# Patient Record
Sex: MALE | Race: BLACK OR AFRICAN AMERICAN | Employment: UNEMPLOYED | URBAN - METROPOLITAN AREA
[De-identification: names, ages, dates, MRNs, and addresses within clinical notes are randomized per-mention and may not be internally consistent; named-entity substitution may affect disease eponyms.]

---

## 2019-12-03 ENCOUNTER — OFFICE VISIT (OUTPATIENT)
Dept: INTERNAL MEDICINE CLINIC | Age: 34
End: 2019-12-03

## 2019-12-03 VITALS
HEART RATE: 105 BPM | SYSTOLIC BLOOD PRESSURE: 150 MMHG | RESPIRATION RATE: 20 BRPM | BODY MASS INDEX: 24.92 KG/M2 | HEIGHT: 72 IN | DIASTOLIC BLOOD PRESSURE: 86 MMHG | WEIGHT: 184 LBS | TEMPERATURE: 99 F | OXYGEN SATURATION: 97 %

## 2019-12-03 DIAGNOSIS — R19.5 CHANGE IN STOOL CALIBER: Primary | ICD-10-CM

## 2019-12-03 DIAGNOSIS — R03.0 ELEVATED BLOOD PRESSURE READING: ICD-10-CM

## 2019-12-03 DIAGNOSIS — F43.21 SITUATIONAL DEPRESSION: ICD-10-CM

## 2019-12-03 DIAGNOSIS — F12.90 MARIJUANA USE: ICD-10-CM

## 2019-12-03 DIAGNOSIS — Z13.29 SCREENING FOR THYROID DISORDER: ICD-10-CM

## 2019-12-03 DIAGNOSIS — Z13.220 SCREENING CHOLESTEROL LEVEL: ICD-10-CM

## 2019-12-03 NOTE — PROGRESS NOTES
Low grade temp today 99.0. Depression Screen-6. Patient states moving his bowels and a pencil shape stool came out about 2-3 weeks ago. Patient also has a candida breakout around his genitals. Previous PCP none.

## 2019-12-03 NOTE — PROGRESS NOTES
Chief Complaint   Patient presents with   [address]     he is a 28y.o. year old male who presents for evaluation. Today to get established. Patient reports he has not seen a physician in many years. He states his last eye exam was approximately 2 to 3 years ago. He has recently seen his dentist.  Patient reports his main concern today is about the change in his stool. He reports that he has been having stools that are shaped like pencils. He reports he is not sure if it is like this because this at the end of the stool or if there is something else going on patient does report that he has some intermittent left upper quadrant pain. He reports he did notice some color change in his stool while back but this is not been recently. Mr. Sherice Ortiz reports that he smokes marijuana daily. He states that he has been having some depression. This started after he lost his job here. Patient reports that he is trying to work through feelings that he has about this. Allergies no known allergies  History reviewed. No pertinent past medical history. History reviewed. No pertinent surgical history. History reviewed. No pertinent family history.   Social History     Socioeconomic History    Marital status: SINGLE     Spouse name: Not on file    Number of children: Not on file    Years of education: Not on file    Highest education level: Not on file   Tobacco Use    Smoking status: Current Every Day Smoker     Years: 18.00     Types: Pipe    Smokeless tobacco: Never Used    Tobacco comment: Balck and Milds cigars   Substance and Sexual Activity    Alcohol use: Yes     Frequency: Monthly or less     Drinks per session: 1 or 2     Binge frequency: Never    Drug use: Yes     Frequency: 10.0 times per week     Types: Marijuana     Comment: 1/4 oz    Sexual activity: Not Currently           Review of Systems - negative except as listed above    Objective:     Vitals:    12/03/19 1301 12/03/19 1317 12/03/19 1340 BP: (!) 163/102 (!) 159/104 150/86   Pulse: (!) 106 (!) 105    Resp: 20     Temp: 99 °F (37.2 °C)     TempSrc: Oral     SpO2: 97%     Weight: 184 lb (83.5 kg)     Height: 6' (1.829 m)       Physical Examination: General appearance - alert, well appearing, and in no distress  Mental status - normal mood, behavior, speech, dress, motor activity, and thought processes, patient is not suicidal or homicidal  Chest - clear to auscultation, no wheezes, rales or rhonchi, symmetric air entry  Heart - normal rate and regular rhythm, no murmurs noted  Abdomen - soft, nontender, nondistended, no masses or organomegaly    Assessment/ Plan:   Diagnoses and all orders for this visit:    1. Change in stool caliber  -     METABOLIC PANEL, COMPREHENSIVE; Future  -     REFERRAL TO GASTROENTEROLOGY    2. Situational depression  -     CBC WITH AUTOMATED DIFF; Future  -     METABOLIC PANEL, COMPREHENSIVE; Future  -     TSH 3RD GENERATION; Future    3. Marijuana use    4. Elevated blood pressure reading  -     CBC WITH AUTOMATED DIFF; Future  -     METABOLIC PANEL, COMPREHENSIVE; Future  -     TSH 3RD GENERATION; Future    5. Screening cholesterol level  -     LIPID PANEL; Future    6. Screening for thyroid disorder       Patient has been given a referral to see the gastroenterologist about the change of his stools. His blood pressure was elevated here in the office today. Patient reports that when he saw the dentist 2 weeks ago his blood pressure was okay. I have asked the patient to please  a blood pressure cuff for home and start to take his blood pressure daily. I would like for him to return here in 2 weeks with his blood pressure cuff and the readings from home. At that time we will compare his readings from home on his cuff with our cuff from here. The patient I talked about his marijuana use. I have explained to him that the marijuana may not be helping his mood. I have suggested that he consider medication. Patient does not want to start medication. He may be open to seeing a counselor therapist to help him with his feelings that he has about his previous job. The patient will get some labs done for me today. He will be contacted with these results once they are back. I have discussed the diagnosis with the patient and the intended plan as seen in the above orders. The patient has received an after-visit summary and questions were answered concerning future plans.      Medication Side Effects and Warnings were discussed with patient: n/a  Patient Labs were reviewed and or requested: n/a  Patient Past Records were reviewed and or requested  yes    Mary Bobo PA-C

## 2019-12-05 ENCOUNTER — HOSPITAL ENCOUNTER (OUTPATIENT)
Dept: LAB | Age: 34
Discharge: HOME | End: 2019-12-05

## 2019-12-05 DIAGNOSIS — R19.5 CHANGE IN STOOL CALIBER: ICD-10-CM

## 2019-12-05 DIAGNOSIS — F43.21 SITUATIONAL DEPRESSION: ICD-10-CM

## 2019-12-05 DIAGNOSIS — Z13.220 SCREENING CHOLESTEROL LEVEL: ICD-10-CM

## 2019-12-05 DIAGNOSIS — R03.0 ELEVATED BLOOD PRESSURE READING: ICD-10-CM

## 2019-12-05 LAB
ALBUMIN SERPL-MCNC: 4.2 G/DL (ref 3.5–5)
ALBUMIN/GLOB SERPL: 1.1 {RATIO} (ref 1.1–2.2)
ALP SERPL-CCNC: 92 U/L (ref 45–117)
ALT SERPL-CCNC: 89 U/L (ref 12–78)
ANION GAP SERPL CALC-SCNC: 5 MMOL/L (ref 5–15)
AST SERPL-CCNC: 32 U/L (ref 15–37)
BASOPHILS # BLD: 0 K/UL (ref 0–0.1)
BASOPHILS NFR BLD: 1 % (ref 0–1)
BILIRUB SERPL-MCNC: 0.3 MG/DL (ref 0.2–1)
BUN SERPL-MCNC: 9 MG/DL (ref 6–20)
BUN/CREAT SERPL: 9 (ref 12–20)
CALCIUM SERPL-MCNC: 9.2 MG/DL (ref 8.5–10.1)
CHLORIDE SERPL-SCNC: 108 MMOL/L (ref 97–108)
CHOLEST SERPL-MCNC: 189 MG/DL
CO2 SERPL-SCNC: 29 MMOL/L (ref 21–32)
CREAT SERPL-MCNC: 1.04 MG/DL (ref 0.7–1.3)
DIFFERENTIAL METHOD BLD: ABNORMAL
EOSINOPHIL # BLD: 0.1 K/UL (ref 0–0.4)
EOSINOPHIL NFR BLD: 2 % (ref 0–7)
ERYTHROCYTE [DISTWIDTH] IN BLOOD BY AUTOMATED COUNT: 15 % (ref 11.5–14.5)
GLOBULIN SER CALC-MCNC: 3.8 G/DL (ref 2–4)
GLUCOSE SERPL-MCNC: 111 MG/DL (ref 65–100)
HCT VFR BLD AUTO: 45.9 % (ref 36.6–50.3)
HDLC SERPL-MCNC: 47 MG/DL
HDLC SERPL: 4 {RATIO} (ref 0–5)
HGB BLD-MCNC: 14.4 G/DL (ref 12.1–17)
IMM GRANULOCYTES # BLD AUTO: 0 K/UL (ref 0–0.04)
IMM GRANULOCYTES NFR BLD AUTO: 0 % (ref 0–0.5)
LDLC SERPL CALC-MCNC: 129.4 MG/DL (ref 0–100)
LIPID PROFILE,FLP: ABNORMAL
LYMPHOCYTES # BLD: 2.4 K/UL (ref 0.8–3.5)
LYMPHOCYTES NFR BLD: 43 % (ref 12–49)
MCH RBC QN AUTO: 27.3 PG (ref 26–34)
MCHC RBC AUTO-ENTMCNC: 31.4 G/DL (ref 30–36.5)
MCV RBC AUTO: 87.1 FL (ref 80–99)
MONOCYTES # BLD: 0.5 K/UL (ref 0–1)
MONOCYTES NFR BLD: 9 % (ref 5–13)
NEUTS SEG # BLD: 2.5 K/UL (ref 1.8–8)
NEUTS SEG NFR BLD: 45 % (ref 32–75)
NRBC # BLD: 0 K/UL (ref 0–0.01)
NRBC BLD-RTO: 0 PER 100 WBC
PLATELET # BLD AUTO: 301 K/UL (ref 150–400)
PMV BLD AUTO: 9.9 FL (ref 8.9–12.9)
POTASSIUM SERPL-SCNC: 4 MMOL/L (ref 3.5–5.1)
PROT SERPL-MCNC: 8 G/DL (ref 6.4–8.2)
RBC # BLD AUTO: 5.27 M/UL (ref 4.1–5.7)
SODIUM SERPL-SCNC: 142 MMOL/L (ref 136–145)
TRIGL SERPL-MCNC: 63 MG/DL (ref ?–150)
TSH SERPL DL<=0.05 MIU/L-ACNC: 1.54 UIU/ML (ref 0.36–3.74)
VLDLC SERPL CALC-MCNC: 12.6 MG/DL
WBC # BLD AUTO: 5.6 K/UL (ref 4.1–11.1)

## 2019-12-06 LAB
EST. AVERAGE GLUCOSE BLD GHB EST-MCNC: 123 MG/DL
HBA1C MFR BLD: 5.9 % (ref 4–5.6)

## 2019-12-06 NOTE — PROGRESS NOTES
Please let the patient know his labs over all are good. I would like to add hemoglobin a1c due to elevated glucose. Please add this lab. ALT slightly elevated. We can recheck in 3 months.  thanks

## 2019-12-06 NOTE — PROGRESS NOTES
Writer contacted patient to inform of of lab results and further information per Gloria Messer, patient verbalized understanding.

## 2019-12-07 NOTE — PROGRESS NOTES
Please let the patient know that his hemoglobin A1c was 5.9. This is considered to be in the prediabetes range. I would like for the patient to really be mindful of his carbohydrates and sugary foods. Advised the patient to get regular exercise. I would like for us to recheck this number when we rechecked his liver function in 3 months.   Microphone off

## 2019-12-09 ENCOUNTER — TELEPHONE (OUTPATIENT)
Dept: INTERNAL MEDICINE CLINIC | Age: 34
End: 2019-12-09

## 2019-12-09 NOTE — PROGRESS NOTES
Writer spoke with patient to inform of lab results and instruction per Nohemi Casey, patient verbalized understanding.

## 2019-12-17 ENCOUNTER — OFFICE VISIT (OUTPATIENT)
Dept: INTERNAL MEDICINE CLINIC | Age: 34
End: 2019-12-17

## 2019-12-17 VITALS
TEMPERATURE: 98.4 F | HEART RATE: 83 BPM | RESPIRATION RATE: 20 BRPM | OXYGEN SATURATION: 97 % | WEIGHT: 189 LBS | SYSTOLIC BLOOD PRESSURE: 146 MMHG | BODY MASS INDEX: 25.6 KG/M2 | DIASTOLIC BLOOD PRESSURE: 86 MMHG | HEIGHT: 72 IN

## 2019-12-17 DIAGNOSIS — R03.0 ELEVATED BLOOD PRESSURE READING: Primary | ICD-10-CM

## 2019-12-17 DIAGNOSIS — F12.90 MARIJUANA USE: ICD-10-CM

## 2019-12-17 DIAGNOSIS — F43.21 SITUATIONAL DEPRESSION: ICD-10-CM

## 2019-12-17 NOTE — PROGRESS NOTES
Chief Complaint   Patient presents with    Hypertension    Nicotine Dependence     he is a 28y.o. year old male who presents for evaluation. Patient presents to the office to follow-up his previous visit. At visit the patient's blood pressure was elevated. I asked the patient to get a blood pressure cuff for home and start to check his blood pressure at home. The patient returned today with his blood pressure readings the highest being 140/90. He also is interested in discussing his labs as well as stopping to smoke cigars. Reviewed PmHx, RxHx, FmHx, SocHx, AllgHx and updated and dated in the chart. Review of Systems - negative except as listed above    Objective:     Vitals:    12/17/19 1417 12/17/19 1445   BP: (!) 162/97 146/86   Pulse: 83    Resp: 20    Temp: 98.4 °F (36.9 °C)    TempSrc: Oral    SpO2: 97%    Weight: 189 lb (85.7 kg)    Height: 6' (1.829 m)      Physical Examination: General appearance - alert, well appearing, and in no distress  Mental status - normal mood, behavior, speech, dress, motor activity, and thought processes, patient not suicidal or homicidal.    Assessment/ Plan:   Diagnoses and all orders for this visit:    1. Elevated blood pressure reading    2. Situational depression    3. Marijuana use    Patient's blood pressure was elevated slightly in the office today again. I suspect the patient is having white coat syndrome when here. I have asked him to continue to check his blood pressure at home and record the readings. We talked at length about his marijuana use and stopping of the cigars. Patient reports that he will try to work on this. He shares that he is considering moving to [place] with 1 of his friends. Today during the visit I went over all of his labs. His ALT was slightly elevated and we will recheck this in about 3 months. Patient still has his appointment with Dr. Lynett Meckel for February he believes the fifth.   I would like to see him back in the office here in 6 months. At that time he is to bring in his blood pressure readings. Patient has denied at this time to get a referral for a counselor. He reports that he would rather speak with the people that he has concerns with. The patient has assured me that this will not be a problem for him. Total encounter time was 25 minutes; over 50% of the time was spent counseling and coordinating care regarding blood pressure, marijuana use and depression. I have discussed the diagnosis with the patient and the intended plan as seen in the above orders. The patient has received an after-visit summary and questions were answered concerning future plans.      Medication Side Effects and Warnings were discussed with patient: n/a  Patient Labs were reviewed and or requested: yes  Patient Past Records were reviewed and or requested  yes    Mary Bobo PA-C

## 2020-03-30 ENCOUNTER — ANESTHESIA EVENT (OUTPATIENT)
Dept: ENDOSCOPY | Age: 35
End: 2020-03-30
Payer: COMMERCIAL

## 2020-03-30 ENCOUNTER — HOSPITAL ENCOUNTER (OUTPATIENT)
Age: 35
Setting detail: OUTPATIENT SURGERY
Discharge: HOME | End: 2020-03-30
Attending: INTERNAL MEDICINE | Admitting: INTERNAL MEDICINE
Payer: COMMERCIAL

## 2020-03-30 ENCOUNTER — ANESTHESIA (OUTPATIENT)
Dept: ENDOSCOPY | Age: 35
End: 2020-03-30
Payer: COMMERCIAL

## 2020-03-30 VITALS
HEART RATE: 92 BPM | HEIGHT: 72 IN | BODY MASS INDEX: 22.25 KG/M2 | SYSTOLIC BLOOD PRESSURE: 135 MMHG | DIASTOLIC BLOOD PRESSURE: 85 MMHG | TEMPERATURE: 98 F | OXYGEN SATURATION: 97 % | WEIGHT: 164.24 LBS | RESPIRATION RATE: 18 BRPM

## 2020-03-30 PROCEDURE — 76060000031 HC ANESTHESIA FIRST 0.5 HR: Performed by: INTERNAL MEDICINE

## 2020-03-30 PROCEDURE — 74011000250 HC RX REV CODE- 250: Performed by: NURSE ANESTHETIST, CERTIFIED REGISTERED

## 2020-03-30 PROCEDURE — 77030013992 HC SNR POLYP ENDOSC BSC -B: Performed by: INTERNAL MEDICINE

## 2020-03-30 PROCEDURE — 88305 TISSUE EXAM BY PATHOLOGIST: CPT

## 2020-03-30 PROCEDURE — 74011250636 HC RX REV CODE- 250/636: Performed by: NURSE ANESTHETIST, CERTIFIED REGISTERED

## 2020-03-30 PROCEDURE — 76040000019: Performed by: INTERNAL MEDICINE

## 2020-03-30 PROCEDURE — 74011250636 HC RX REV CODE- 250/636: Performed by: INTERNAL MEDICINE

## 2020-03-30 RX ORDER — PROPOFOL 10 MG/ML
INJECTION, EMULSION INTRAVENOUS
Status: DISCONTINUED | OUTPATIENT
Start: 2020-03-30 | End: 2020-03-30 | Stop reason: HOSPADM

## 2020-03-30 RX ORDER — SODIUM CHLORIDE 9 MG/ML
INJECTION, SOLUTION INTRAVENOUS
Status: DISCONTINUED | OUTPATIENT
Start: 2020-03-30 | End: 2020-03-30 | Stop reason: HOSPADM

## 2020-03-30 RX ORDER — CHOLECALCIFEROL (VITAMIN D3) 50 MCG
1 CAPSULE ORAL DAILY
COMMUNITY
End: 2020-06-16 | Stop reason: ALTCHOICE

## 2020-03-30 RX ORDER — EPINEPHRINE 0.1 MG/ML
1 INJECTION INTRACARDIAC; INTRAVENOUS
Status: DISCONTINUED | OUTPATIENT
Start: 2020-03-30 | End: 2020-03-30 | Stop reason: HOSPADM

## 2020-03-30 RX ORDER — SODIUM CHLORIDE 9 MG/ML
50 INJECTION, SOLUTION INTRAVENOUS CONTINUOUS
Status: DISCONTINUED | OUTPATIENT
Start: 2020-03-30 | End: 2020-03-30 | Stop reason: HOSPADM

## 2020-03-30 RX ORDER — NALOXONE HYDROCHLORIDE 0.4 MG/ML
0.4 INJECTION, SOLUTION INTRAMUSCULAR; INTRAVENOUS; SUBCUTANEOUS
Status: DISCONTINUED | OUTPATIENT
Start: 2020-03-30 | End: 2020-03-30 | Stop reason: HOSPADM

## 2020-03-30 RX ORDER — DEXTROMETHORPHAN/PSEUDOEPHED 2.5-7.5/.8
1.2 DROPS ORAL
Status: DISCONTINUED | OUTPATIENT
Start: 2020-03-30 | End: 2020-03-30 | Stop reason: HOSPADM

## 2020-03-30 RX ORDER — FLUMAZENIL 0.1 MG/ML
0.2 INJECTION INTRAVENOUS
Status: DISCONTINUED | OUTPATIENT
Start: 2020-03-30 | End: 2020-03-30 | Stop reason: HOSPADM

## 2020-03-30 RX ORDER — LIDOCAINE HYDROCHLORIDE 20 MG/ML
INJECTION, SOLUTION EPIDURAL; INFILTRATION; INTRACAUDAL; PERINEURAL AS NEEDED
Status: DISCONTINUED | OUTPATIENT
Start: 2020-03-30 | End: 2020-03-30 | Stop reason: HOSPADM

## 2020-03-30 RX ORDER — BISMUTH SUBSALICYLATE 262 MG
1 TABLET,CHEWABLE ORAL DAILY
COMMUNITY
End: 2021-10-21 | Stop reason: ALTCHOICE

## 2020-03-30 RX ORDER — PROPOFOL 10 MG/ML
INJECTION, EMULSION INTRAVENOUS AS NEEDED
Status: DISCONTINUED | OUTPATIENT
Start: 2020-03-30 | End: 2020-03-30 | Stop reason: HOSPADM

## 2020-03-30 RX ORDER — BACLOFEN 20 MG
1 TABLET ORAL 2 TIMES DAILY
COMMUNITY
End: 2021-10-21 | Stop reason: ALTCHOICE

## 2020-03-30 RX ORDER — MIDAZOLAM HYDROCHLORIDE 1 MG/ML
.25-5 INJECTION, SOLUTION INTRAMUSCULAR; INTRAVENOUS
Status: DISCONTINUED | OUTPATIENT
Start: 2020-03-30 | End: 2020-03-30 | Stop reason: HOSPADM

## 2020-03-30 RX ORDER — LANOLIN ALCOHOL/MO/W.PET/CERES
1000 CREAM (GRAM) TOPICAL DAILY
COMMUNITY
End: 2021-10-21 | Stop reason: ALTCHOICE

## 2020-03-30 RX ORDER — ATROPINE SULFATE 0.1 MG/ML
0.4 INJECTION INTRAVENOUS
Status: DISCONTINUED | OUTPATIENT
Start: 2020-03-30 | End: 2020-03-30 | Stop reason: HOSPADM

## 2020-03-30 RX ORDER — AMPICILLIN TRIHYDRATE 500 MG
1000 CAPSULE ORAL DAILY
COMMUNITY
End: 2021-10-21 | Stop reason: ALTCHOICE

## 2020-03-30 RX ADMIN — PROPOFOL 100 MG: 10 INJECTION, EMULSION INTRAVENOUS at 13:14

## 2020-03-30 RX ADMIN — PROPOFOL 140 MCG/KG/MIN: 10 INJECTION, EMULSION INTRAVENOUS at 13:13

## 2020-03-30 RX ADMIN — LIDOCAINE HYDROCHLORIDE 30 MG: 20 INJECTION, SOLUTION INTRAVENOUS at 13:14

## 2020-03-30 RX ADMIN — SODIUM CHLORIDE: 9 INJECTION, SOLUTION INTRAVENOUS at 13:10

## 2020-03-30 RX ADMIN — SODIUM CHLORIDE 50 ML/HR: 900 INJECTION, SOLUTION INTRAVENOUS at 12:25

## 2020-03-30 RX ADMIN — PROPOFOL 100 MG: 10 INJECTION, EMULSION INTRAVENOUS at 13:17

## 2020-03-30 NOTE — H&P
The patient is a 27year old male who presents with a complaint of Change in bowel habits. The patient presents consultation at the request of  (Ms Jeremy Calhoun). Note for \"Change in bowel habits\": Mid last year he started to notiec a change in the caliber of his stools, now they are pencil thin, but not consistent. He reports he has two to three bowel movements a day, depends on how much he eats. He feels some abdominal pain in the LUQ area, feels tight. He has seen blood in the stools, bright and dark. 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 He denies any family history of colon cancer or colon polyps. Problem List/Past Medical Karime Mcintosh; 2/5/2020 9:59 AM)  Candidiasis (112.9  B37.9)      Allergies Karime Mcintosh; 2/5/2020 9:59 AM)  No Known Drug Allergies  [01/28/2020]:  No Known Allergies  [01/28/2020]:    Medication History Karime Mcintosh; 2/5/2020 10:00 AM)  No Current Medications     Social History (Karime Mcintosh; 2/5/2020 9:59 AM)  Blood Transfusion   No.  Alcohol Use   Occasional alcohol use, Drinks beer. Employment status   N/a. Marital status   Single. Tobacco Use   Current every day smoker. Health Maintenance History Karime Mcintosh; 2/5/2020 9:59 AM)  Flu Vaccine   none  Pneumovax   none        Review of Systems (Karime Mcintosh; 2/5/2020 9:59 AM)  General Not Present- Chronic Fatigue, Poor Appetite, Weight Gain and Weight Loss. Skin Present- Itching. Not Present- Rash and Skin Color Changes. HEENT Present- Hearing Loss. Not Present- Vertigo. Respiratory Not Present- Difficulty Breathing and TB exposure. Cardiovascular Not Present- Chest Pain, Use of Antibiotics before Dental Procedures and Use of Blood Thinners. Gastrointestinal Present- See HPI.   Musculoskeletal Not Present- Arthritis, Hip Replacement Surgery and Knee Replacement Surgery. Neurological Present- Weakness. Psychiatric Present- Depression. Endocrine Not Present- Diabetes and Thyroid Problems. Hematology Not Present- Anemia. Vitals Krysta Arrow; 2/5/2020 9:59 AM)  2/5/2020 9:56 AM  Weight: 184.38 lb   Height: 72 in   Body Surface Area: 2.06 m²   Body Mass Index: 25.01 kg/m²    BP: 140/88 (Sitting, Left Arm, Standard)              Physical Exam (Jerry Noriega MD; 2/5/2020 10:07 AM)  General  Mental Status - Alert. General Appearance - Cooperative, Pleasant, Not in acute distress. Orientation - Oriented X3. Build & Nutrition - Well nourished and Well developed. Integumentary  General Characteristics  Overall examination of the patient's skin reveals - no rashes, no bruises and no spider angiomas. Color - normal coloration of skin. Head and Neck  Neck  Global Assessment - full range of motion and supple, no bruit auscultated on the right, no bruit auscultated on the left, non-tender, no lymphadenopathy. Thyroid  Gland Characteristics - normal size and consistency. Eye  Eyeball - Left - No Exophthalmos. Eyeball - Right - No Exophthalmos. Sclera/Conjunctiva - Left - No Jaundice. Sclera/Conjunctiva - Right - No Jaundice. Chest and Lung Exam  Chest and lung exam reveals  - quiet, even and easy respiratory effort with no use of accessory muscles. Auscultation  Breath sounds - Normal. Adventitious sounds - No Adventitious sounds. Cardiovascular  Auscultation  Rhythm - Regular, No Tachycardia, No Bradycardia . Heart Sounds - Normal heart sounds , S1 WNL and S2 WNL, No S3, No Summation Gallop. Murmurs & Other Heart Sounds - Auscultation of the heart reveals - No Murmurs. Abdomen  Inspection  Inspection of the abdomen reveals - Non-distended. Palpation/Percussion  Tenderness - Left Upper Quadrant (mild). Rebound tenderness - No rebound. Liver - No hepatosplenomegaly. Abdominal Mass Palpable - No masses.  Other Characteristics - No Ascites. Organomegally - None. Auscultation  Auscultation of the abdomen reveals - Bowel sounds normal, No Abdominal bruits and No Succussion splash. Rectal - Did not examine. Peripheral Vascular  Upper Extremity  Inspection - Left - Normal - No Clubbing, No Cyanosis, No Edema, Pulses Intact. Right - Normal - No Clubbing, No Cyanosis, No Edema, Pulses Intact. Palpation - Edema - Left - No edema. Right - No edema. Lower Extremity  Inspection - Left - Inspection Normal. Right - Inspection Normal. Palpation - Edema - Left - No edema. Right - No edema. Neurologic  Neurologic evaluation reveals  - Cranial nerves grossly intact, no focal neurologic deficits. Motor  Involuntary Movements - Asterixis - not present. Musculoskeletal  Global Assessment  Gait and Station - normal gait and station. Assessment & Plan (Jerry Goldman MD; 2/6/2020 8:01 PM)  Abdominal pain, acute, left upper quadrant (789.02  R10.12)  Impression: Suspect this to be related to constipation. However with pain, pencil thin stools and blood in the stools, will need to proceed with colonoscopy. Constipation (Preliminary Diagnosis) (564.00  K59.00)  Impression: Miralax and citrucel daily recommended along with increased daily fluid intake. Blood in stool (578.1  K92.1)  Current Plans  COLONOSCOPY, DIAGNOSTIC (43710) (Discussed risks and benefits with the patient to include:; perforation, post polypectomy, or post biopsy bleeding, missed lesions, and sedation reactions.)  Started Suprep Bowel Prep Kit 17.5-3.13-1.6GM/177ML, 180 Milliliter Take as directed before Colonoscopy, 180 Milliliter, 1 day starting 02/05/2020, No Refill. Pt Education - How to access health information online: discussed with patient and provided information. Patient is to call me for any questions or concerns.   Signed electronically by Christina Iyer MD (2/6/2020 8:01 PM)

## 2020-03-30 NOTE — ROUTINE PROCESS
Amity Situ  1985  [id number]    Situation:  Verbal report received from: Gifty Swenson  Procedure: Procedure(s):  COLONOSCOPY (ESSENTIAL)  ENDOSCOPIC POLYPECTOMY    Background:    Preoperative diagnosis: CHANGE IN BOWEL HABITS  Postoperative diagnosis: Diverticulosis, hemorrhoids, polyps. :  Dr. Inge Morris  Assistant(s): Endoscopy Technician-1: Rashmi Frausto  Endoscopy RN-1: Stephanie Souza RN    Specimens:   ID Type Source Tests Collected by Time Destination   1 : sigmoid colon polyps Preservative Sigmoid  Alta Srinivasan MD 3/30/2020 1326 Pathology     H. Pylori  no    Assessment:  Intra-procedure medications     Anesthesia gave intra-procedure sedation and medications, see anesthesia flow sheet yes    Intravenous fluids: NS@ KVO     Vital signs stable     Abdominal assessment: round and soft    Recommendation:  Discharge patient per MD order.     Family or Friend  Permission to share finding with family or friend yes

## 2020-03-30 NOTE — PERIOP NOTES
1313  Anesthesia staff at patient's bedside administering anesthesia and monitoring patients vital signs throughout procedure. See anesthesia note. 26  Endoscope was pre-cleaned at bedside immediately following procedure by demetrio Egan. 1333  Patient tolerated procedure without problems. Abdomen soft and patient arousable and voices no complaints Report received from CRNA, see anesthesia note. Patient transported to endoscopy recovery area. Report given to Aggie Urbina RN.

## 2020-03-30 NOTE — ANESTHESIA POSTPROCEDURE EVALUATION
Procedure(s):  COLONOSCOPY (ESSENTIAL)  ENDOSCOPIC POLYPECTOMY. MAC    Anesthesia Post Evaluation        Patient location during evaluation: PACU  Level of consciousness: awake  Pain management: adequate  Airway patency: patent  Anesthetic complications: no  Cardiovascular status: acceptable  Respiratory status: acceptable  Hydration status: acceptable  Post anesthesia nausea and vomiting:  none      Vitals Value Taken Time   /86 3/30/2020  1:51 PM   Temp 36.7 °C (98 °F) 3/30/2020  1:38 PM   Pulse 92 3/30/2020  1:56 PM   Resp 17 3/30/2020  1:55 PM   SpO2 97 % 3/30/2020  1:56 PM   Vitals shown include unvalidated device data.

## 2020-03-30 NOTE — PROCEDURES
Yadira Alvarez M.D.  [phone]            3/30/2020          Colonoscopy Operative Report  Jhon Nayak  :  1985  [facility] Medical Record Number:  [id number]      Indications:    Change in bowel habits, Lower rectal bleeding, LUQ pain    :  Adeel Pierre MD    Referring Provider: Loco Berkowitz PA-C    Sedation:  MAC anesthesia    Pre-Procedural Exam:      Airway: clear,  No airway problems anticipated  Heart: RRR, without gallops or rubs  Lungs: clear bilaterally without wheezes, crackles, or rhonchi  Abdomen: soft, nontender, nondistended, bowel sounds present  Mental Status: awake, alert and oriented to person, place and time     Procedure Details:  After informed consent was obtained with all risks and benefits of procedure explained and preoperative exam completed, the patient was taken to the endoscopy suite and placed in the left lateral decubitus position. Upon sequential sedation as per above, a digital rectal exam was performed. The Olympus videocolonoscope  was inserted in the rectum and carefully advanced to the cecum, which was identified by the ileocecal valve and appendiceal orifice. The quality of preparation was good. The colonoscope was slowly withdrawn with careful inspection and evaluation between folds. Retroflexion in the rectum was performed. Findings:   Terminal Ileum: normal  Cecum: normal  Ascending Colon: no mucosal lesion appreciated  mild diverticulosis;  Transverse Colon: normal  Descending Colon: normal  Sigmoid: 3  Sessile polyp(s), the largest 2 mm in size  mild diverticulosis; Rectum: no mucosal lesion appreciated  Grade 1 internal hemorrhoid(s); Interventions:  3 complete polypectomy were performed using cold biopsy forceps and the polyps were  retrieved    Specimen Removed:  specimen #1, 2 mm in size, located in the sigmoid removed by cold biopsy and sent for pathology    Complications: None.      EBL: None.    Impression: Three diminutive polyps seen in the sigmoid colon that were removed and sent to pathology                         Minimal diverticulosis and small internal hemorrhoids    Recommendations:  -If adenoma is present, repeat colonoscopy in 5 years.   -High fiber diet and maintain daily bowel regimen  -Resume normal medication(s). Discharge Disposition:  Home in the company of a  when able to ambulate.     Yesi Hinojosa MD  3/30/2020  1:32 PM

## 2020-03-30 NOTE — ANESTHESIA PREPROCEDURE EVALUATION
Relevant Problems   No relevant active problems       Anesthetic History   No history of anesthetic complications            Review of Systems / Medical History  Patient summary reviewed, nursing notes reviewed and pertinent labs reviewed    Pulmonary  Within defined limits                 Neuro/Psych   Within defined limits           Cardiovascular  Within defined limits                     GI/Hepatic/Renal     GERD           Endo/Other  Within defined limits           Other Findings              Physical Exam    Airway  Mallampati: I  TM Distance: 4 - 6 cm  Neck ROM: normal range of motion   Mouth opening: Normal     Cardiovascular    Rhythm: regular  Rate: normal         Dental    Dentition: Lower dentition intact and Upper dentition intact     Pulmonary  Breath sounds clear to auscultation               Abdominal         Other Findings            Anesthetic Plan    ASA: 2  Anesthesia type: MAC          Induction: Intravenous  Anesthetic plan and risks discussed with: Patient

## 2020-03-30 NOTE — DISCHARGE INSTRUCTIONS
[address] [facility]. Julien Viveros M.D.  [phone]            COLON DISCHARGE INSTRUCTIONS       3/30/2020    Marjorie Ramos  :  1985  [facility] Medical Record Number:  [id number]      COLONOSCOPY FINDINGS:  Your colonoscopy showed three diminutive and benign looking polyps, minimal diverticulosis and small internal hemorrhoids. DISCOMFORT:  Redness at IV site- apply warm compress to area; if redness or soreness persist- contact your physician  There may be a slight amount of blood passed from the rectum  Gaseous discomfort- walking, belching will help relieve any discomfort  You may not operate a vehicle for 12 hours  You may not engage in an occupation involving machinery or appliances for rest of today  You may not drink alcoholic beverages for at least 12 hours  Avoid making any critical decisions for at least 24 hour  DIET:   High fiber diet. - however -  remember your colon is empty and a heavy meal will produce gas. Avoid these foods:  vegetables, fried / greasy foods, carbonated drinks for today     ACTIVITY:  You may resume your normal daily activities it is recommended that you spend the remainder of the day resting -  avoid any strenuous activity. CALL M.D. ANY SIGN OF:   Increasing pain, nausea, vomiting  Abdominal distension (swelling)  New increased bleeding (oral or rectal)  Fever (chills)  Pain in chest area  Bloody discharge from nose or mouth   Shortness of breath    Follow-up Instructions:   Call Dr. Mela Michael if any questions or problems. Telephone # [phone]  Biopsy results will be available in  5 to 7 days  Should have a repeat colonoscopy in 5 years if polyps show adenoma, otherwise repeat at age 47. Continue with daily bowel regimen.

## 2020-06-16 ENCOUNTER — VIRTUAL VISIT (OUTPATIENT)
Dept: INTERNAL MEDICINE CLINIC | Age: 35
End: 2020-06-16

## 2020-06-16 DIAGNOSIS — R03.0 ELEVATED BLOOD PRESSURE READING: Primary | ICD-10-CM

## 2020-06-16 DIAGNOSIS — K59.09 INTERMITTENT CONSTIPATION: ICD-10-CM

## 2020-06-16 NOTE — PROGRESS NOTES
Manny Moore is a 27 y.o. male who was seen by synchronous (real-time) audio-video technology on 6/16/2020. Consent: Manny Moore, who was seen by synchronous (real-time) audio-video technology, and/or his healthcare decision maker, is aware that this patient-initiated, Telehealth encounter on 6/16/2020 is a billable service, with coverage as determined by his insurance carrier. He is aware that he may receive a bill and has provided verbal consent to proceed: Yes. Assessment & Plan:   Diagnoses and all orders for this visit:    1. Elevated blood pressure reading    2. Intermittent constipation    Patient I spoke about continuing to monitor blood pressure at home. We also spoke about changing his diet. I would like for the patient to cut back on fried foods and fast foods. Also would like for the patient to go back getting regular exercise such as walking. I would like for the patient to follow-up in about 3 months to see how he is doing with this change. I explained to the patient that I have not heard of the person having blood on the stool with the use of pre-biotics or probiotics. I explained that I am doubtful that it is coming from that especially since the patient is not experiencing any type of abdominal pain. I suspect this is coming from him having to strain due to some harder stools. I will look into this to see if I find anything on this and let the patient know. I spent at least 25 minutes on this visit with this established patient. [id number]  Subjective:   Manny Moore is a 27 y.o. male who was seen for Medication Evaluation  Patient presents to follow-up his blood pressure and he has a question about probiotics versus pre-biotics. He reports up until about 3 to 4 weeks ago he was trying to walk more. He reports that his diet has not changed substantially since last time. Patient admits that he is eating fast food and eating later at night.   The patient reports he has been checking his blood pressure and it has been ranging around 140/80 and a little lower. The patient also has a question about probiotics versus prebiotic's. He reports that he noticed whenever he uses these he has some blood on his stool. The patient reports that during this time he may be having some constipation and has to do some straining. Prior to Admission medications    Medication Sig Start Date End Date Taking? Authorizing Provider   TURMERIC PO Take 1 Cap by mouth daily. Provider, Historical   magnesium oxide 500 mg tab Take 1 Tab by mouth two (2) times a day. Indications: bowel regimen    Provider, Historical   omega 3-dha-epa-fish oil 100-160-1,000 mg cap Take 1 Cap by mouth daily. Provider, Historical   cyanocobalamin 1,000 mcg tablet Take 1,000 mcg by mouth daily. Provider, Historical   multivitamin (ONE A DAY) tablet Take 1 Tab by mouth daily. Provider, Historical   cholecalciferol (Vitamin D3) 25 mcg (1,000 unit) cap Take 1,000 Units by mouth daily. Provider, Historical   B.infantis-B.ani-B.long-B.bifi (Probiotic 4X) 10-15 mg TbEC Take 1 Cap by mouth daily. 6/16/20  Provider, Historical     No Known Allergies    There are no active problems to display for this patient. Current Outpatient Medications   Medication Sig Dispense Refill    TURMERIC PO Take 1 Cap by mouth daily.  magnesium oxide 500 mg tab Take 1 Tab by mouth two (2) times a day. Indications: bowel regimen      omega 3-dha-epa-fish oil 100-160-1,000 mg cap Take 1 Cap by mouth daily.  cyanocobalamin 1,000 mcg tablet Take 1,000 mcg by mouth daily.  multivitamin (ONE A DAY) tablet Take 1 Tab by mouth daily.  cholecalciferol (Vitamin D3) 25 mcg (1,000 unit) cap Take 1,000 Units by mouth daily. No Known Allergies    ROS      Objective: There were no vitals taken for this visit.    General: alert, cooperative, no distress   Mental  status: normal mood, behavior, speech, dress, motor activity, and thought processes, able to follow commands   HENT: NCAT   Neck: no visualized mass   Resp: no respiratory distress   Neuro: no gross deficits   Skin: no discoloration or lesions of concern on visible areas   Psychiatric: normal affect, consistent with stated mood, no evidence of hallucinations     Additional exam findings: We discussed the expected course, resolution and complications of the diagnosis(es) in detail. Medication risks, benefits, costs, interactions, and alternatives were discussed as indicated. I advised him to contact the office if his condition worsens, changes or fails to improve as anticipated. He expressed understanding with the diagnosis(es) and plan. Maye Vazquez is a 27 y.o. male who was evaluated by a video visit encounter for concerns as above. Patient identification was verified prior to start of the visit. A caregiver was present when appropriate. Due to this being a TeleHealth encounter (During [facility]-05 public health emergency), evaluation of the following organ systems was limited: Vitals/Constitutional/EENT/Resp/CV/GI//MS/Neuro/Skin/Heme-Lymph-Imm. Pursuant to the emergency declaration under the [facility], On license of [facility] waiver authority and the [employer] Act, this Virtual  Visit was conducted, with patient's (and/or legal guardian's) consent, to reduce the patient's risk of exposure to COVID-19 and provide necessary medical care. Services were provided through a video synchronous discussion virtually to substitute for in-person clinic visit. Patient and provider were located at their individual homes.       Gus Bobo PA-C

## 2021-10-07 ENCOUNTER — OFFICE VISIT (OUTPATIENT)
Dept: INTERNAL MEDICINE CLINIC | Age: 36
End: 2021-10-07
Payer: COMMERCIAL

## 2021-10-07 VITALS
OXYGEN SATURATION: 99 % | TEMPERATURE: 98.2 F | WEIGHT: 186 LBS | SYSTOLIC BLOOD PRESSURE: 148 MMHG | HEIGHT: 72 IN | BODY MASS INDEX: 25.19 KG/M2 | DIASTOLIC BLOOD PRESSURE: 84 MMHG | RESPIRATION RATE: 20 BRPM | HEART RATE: 82 BPM

## 2021-10-07 DIAGNOSIS — F32.A DEPRESSION, UNSPECIFIED DEPRESSION TYPE: ICD-10-CM

## 2021-10-07 DIAGNOSIS — R03.0 ELEVATED BLOOD PRESSURE READING: ICD-10-CM

## 2021-10-07 DIAGNOSIS — N50.9 TESTICULAR ABNORMALITY: Primary | ICD-10-CM

## 2021-10-07 PROCEDURE — 99214 OFFICE O/P EST MOD 30 MIN: CPT | Performed by: PHYSICIAN ASSISTANT

## 2021-10-07 NOTE — PROGRESS NOTES
Chief Complaint   Patient presents with    Depression    Melena    Skin Problem     he is a 38y.o. year old male who presents for evaluation. Patient presents to the office today for couple of concerns. He reports that he was watching YouTube and saw a commercial in reference to testicular cancer. The patient states that he then decided to check his testicles and believes he may have felt a lump on the right side. The patient states that he has not been having pain. He also reports that he has a small bump on the scrotum. The patient states he believes this is due to when he has problems with a yeast infection. He also reports that he has been having inconsistencies with his bowel movements. The patient states that he believes this is related to his diet. The patient states that his diet is not the best.  He admits to eating fast food frequently. The patient states that he contributes his diet habits to when he is feeling depressed. He also reports that he has noticed some blood on the tissue paper when wiping. He reports that this is after he has had a hard stool. Reviewed PmHx, RxHx, FmHx, SocHx, AllgHx and updated and dated in the chart. Review of Systems - negative except as listed above    Objective:     Vitals:    10/07/21 0719 10/07/21 0807   BP: (!) 166/95 (!) 148/84   Pulse: 82    Resp: 20    Temp: 98.2 °F (36.8 °C)    TempSrc: Temporal    SpO2: 99%    Weight: 186 lb (84.4 kg)    Height: 6' (1.829 m)      Physical Examination: General appearance - alert, well appearing, and in no distress  Mental status - normal mood, behavior, speech, dress, motor activity, and thought processes   Male - TESTICULAR EXAM: Small slightly raised papule noted on the right scrotum. I am not able to appreciate a distinct mass of the testicle. No tenderness is noted with physical exam.    Assessment/ Plan:   Diagnoses and all orders for this visit:    1.  Testicular abnormality  -     US SCROTUM/TESTICLES; Future    2. Elevated blood pressure reading    I explained to the patient that the area of concern I believe is the spermatic cord. I will have the patient ultrasound to see if any abnormalities is found. He and I spoke at length today about his diet. The patient's blood pressure was elevated here in the office. He does have a blood pressure cuff at home and advised him to start taking his blood pressure at least once a day. The patient will be returning back here in 2 weeks with his blood pressure reading as well as his cuff. He agrees that change in his diet would help with his bowels as well as his blood pressure. We also talked about his mental health. The patient reports that he knows what he needs to do to move forward. He is not interested in starting on medication at this time. I have discussed the diagnosis with the patient and the intended plan as seen in the above orders. The patient has received an after-visit summary and questions were answered concerning future plans.      Medication Side Effects and Warnings were discussed with patient: n/a  Patient Labs were reviewed and or requested: n/a  Patient Past Records were reviewed and or requested  yes    Mary Bobo PA-C

## 2021-10-07 NOTE — PROGRESS NOTES
1. Have you been to the ER, urgent care clinic since your last visit? Hospitalized since your last visit? No    2. Have you seen or consulted any other health care providers outside of the [address] [place] [address] since your last visit? Include any pap smears or colon screening. Yes, colonoscopy,     Health Maintenance Due   Topic Date Due    Hepatitis C Screening  Never done    Pneumococcal 0-64 years (1 of 2 - PPSV23) Never done    COVID-19 Vaccine (1) Never done    DTaP/Tdap/Td series (1 - Tdap) Never done    Flu Vaccine (1) Never done     Depression Screen 5. Refuses flu shot. Patient c/o heart health, numbness tingling foot, arm, hand, muscle spasms in chest, foot, jaw, about 2 months. Patient c/o possible prolapsed rectum, blood in stool x4 years, patient states seeing skin outside of rectum, x6 months. Patient c/o lumps on and in his scrotum. Patient depression screen 5.

## 2021-10-15 ENCOUNTER — HOSPITAL ENCOUNTER (OUTPATIENT)
Dept: ULTRASOUND IMAGING | Age: 36
Discharge: HOME | End: 2021-10-15
Payer: COMMERCIAL

## 2021-10-15 ENCOUNTER — TELEPHONE (OUTPATIENT)
Dept: INTERNAL MEDICINE CLINIC | Age: 36
End: 2021-10-15

## 2021-10-15 DIAGNOSIS — N50.9 TESTICULAR ABNORMALITY: ICD-10-CM

## 2021-10-15 PROCEDURE — 76870 US EXAM SCROTUM: CPT | Performed by: PHYSICIAN ASSISTANT

## 2021-10-15 NOTE — PROGRESS NOTES
Please let the patient know he has a small hydrocele of the right testicle and a trace of the left. Also the patient has bilateral cyst of the epididymal head. Advise him I would like for him to contact the urologist for an appointment to follow up these findings. [facility] .  Thank you

## 2021-10-21 ENCOUNTER — OFFICE VISIT (OUTPATIENT)
Dept: INTERNAL MEDICINE CLINIC | Age: 36
End: 2021-10-21
Payer: COMMERCIAL

## 2021-10-21 VITALS
TEMPERATURE: 98 F | RESPIRATION RATE: 20 BRPM | SYSTOLIC BLOOD PRESSURE: 138 MMHG | HEART RATE: 89 BPM | DIASTOLIC BLOOD PRESSURE: 78 MMHG | HEIGHT: 72 IN | OXYGEN SATURATION: 98 % | BODY MASS INDEX: 26.55 KG/M2 | WEIGHT: 196 LBS

## 2021-10-21 DIAGNOSIS — R03.0 ELEVATED BLOOD PRESSURE READING: Primary | ICD-10-CM

## 2021-10-21 PROCEDURE — 99213 OFFICE O/P EST LOW 20 MIN: CPT | Performed by: PHYSICIAN ASSISTANT

## 2021-10-21 NOTE — PROGRESS NOTES
Chief Complaint   Patient presents with    Hypertension     he is a 38y.o. year old male who presents for evaluation. The patient presents today to follow-up his blood pressure. He reports that he has been checking at home. The patient reports that his blood pressure has been running 120/80 with the highest being 130/80. The patient states that he is active with exercise. He also shares that he has an appointment to see the urologist next Monday. Reviewed PmHx, RxHx, FmHx, SocHx, AllgHx and updated and dated in the chart. Review of Systems - negative except as listed above    Objective:     Vitals:    10/21/21 0806 10/21/21 0830   BP: (!) 153/91 138/78   Pulse: 89    Resp: 20    Temp: 98 °F (36.7 °C)    TempSrc: Oral    SpO2: 98%    Weight: 196 lb (88.9 kg)    Height: 6' (1.829 m)      Physical Examination: General appearance - alert, well appearing, and in no distress  Mental status - normal mood, behavior, speech, dress, motor activity, and thought processes  Chest - clear to auscultation, no wheezes, rales or rhonchi, symmetric air entry  Heart - normal rate and regular rhythm, no murmurs noted    Assessment/ Plan:   Diagnoses and all orders for this visit:    1. Elevated blood pressure reading       I like for the patient to continue to monitor his blood pressure at home. He will continue to exercise on a regular schedule as well. The patient understands if his blood pressure starts to run high he is to contact the office. I like for the patient to follow-up here in 6 months. Patient will keep his appointment with the urologist for next Monday. I have discussed the diagnosis with the patient and the intended plan as seen in the above orders. The patient has received an after-visit summary and questions were answered concerning future plans.      Medication Side Effects and Warnings were discussed with patient: n/a  Patient Labs were reviewed and or requested: yes  Patient Past Records were reviewed and or requested  yes    Mary Bobo PA-C

## 2023-03-08 ENCOUNTER — OFFICE VISIT (OUTPATIENT)
Dept: URGENT CARE | Age: 38
End: 2023-03-08
Payer: COMMERCIAL

## 2023-03-08 VITALS
RESPIRATION RATE: 16 BRPM | BODY MASS INDEX: 25.36 KG/M2 | TEMPERATURE: 97.8 F | DIASTOLIC BLOOD PRESSURE: 82 MMHG | SYSTOLIC BLOOD PRESSURE: 112 MMHG | HEART RATE: 99 BPM | WEIGHT: 187 LBS | OXYGEN SATURATION: 99 %

## 2023-03-08 DIAGNOSIS — R42 FEELING FAINT: ICD-10-CM

## 2023-03-08 DIAGNOSIS — F41.9 ANXIOUS MOOD: Primary | ICD-10-CM

## 2023-03-08 DIAGNOSIS — G47.00 INSOMNIA, UNSPECIFIED TYPE: ICD-10-CM

## 2023-03-08 PROCEDURE — S9083 URGENT CARE CENTER GLOBAL: HCPCS | Performed by: NURSE PRACTITIONER

## 2023-03-08 PROCEDURE — 93000 ELECTROCARDIOGRAM COMPLETE: CPT | Performed by: NURSE PRACTITIONER

## 2023-03-08 NOTE — PROGRESS NOTES
HPI     Pt presents with concerns about his BP possibly being low for 1-2 weeks. Feels like it is low to him. Occasionally feels faint but no syncopal episodes. Feel stressed and has been smoking more marijuana and pipe tobacco cigarettes (Black and Milds) than usual.  Hasn't slept well in \"years\". Currently not working. States he sits in dark all day watching tv and on his phone. Denies feeling down or depressed. States he's in a state of confusion and rebuilding. Lives alone. Not currently working. Previous job caused stress and PTSD. States he doesn't have friends. Has brothers that he hangs out with from time to time but feels anxious and not sure if it's a positive relationship. Denies support from other family. Has difficulty in social situations. Has been to counseling in past, doesn't feel like it helped but was more agitated then. Past Medical History:   Diagnosis Date    GERD (gastroesophageal reflux disease)         Past Surgical History:   Procedure Laterality Date    COLONOSCOPY N/A 3/30/2020    [facility]         History reviewed. No pertinent family history.      Social History     Socioeconomic History    Marital status: SINGLE     Spouse name: Not on file    Number of children: Not on file    Years of education: Not on file    Highest education level: Not on file   Occupational History    Not on file   Tobacco Use    Smoking status: Every Day     Types: Pipe    Smokeless tobacco: Never    Tobacco comments:     Balck and Milds cigars   Substance and Sexual Activity    Alcohol use: Not Currently    Drug use: Yes     Frequency: 10.0 times per week     Types: Marijuana     Comment: 1/4 oz    Sexual activity: Not Currently   Other Topics Concern    Not on file   Social History Narrative    Not on file     Social Determinants of Health     Financial Resource Strain: Not on file   Food Insecurity: Not on file   Transportation Needs: Not on file   Physical Activity: Not on file   Stress: Not on file   Social Connections: Not on file   Intimate Partner Violence: Not on file   Housing Stability: Not on file                ALLERGIES: Patient has no known allergies. Review of Systems   Constitutional:  Negative for chills and fever. Respiratory:  Negative for shortness of breath. Cardiovascular:  Positive for palpitations. Negative for chest pain and leg swelling. Neurological:  Positive for light-headedness. Negative for dizziness and weakness. Psychiatric/Behavioral:  Positive for confusion, dysphoric mood and sleep disturbance. Negative for agitation and suicidal ideas. The patient is nervous/anxious. Vitals:    03/08/23 1059 03/08/23 1100 03/08/23 1123   BP: (!) 176/99 (!) 166/83 112/82   Pulse: 99     Resp: 16     Temp: 97.8 °F (36.6 °C)     SpO2: 99%     Weight: 187 lb (84.8 kg)         Physical Exam  Constitutional:       General: He is not in acute distress. Appearance: Normal appearance. He is not ill-appearing or toxic-appearing. HENT:      Head: Normocephalic and atraumatic. Mouth/Throat:      Mouth: Mucous membranes are moist.      Pharynx: Oropharynx is clear. Eyes:      Conjunctiva/sclera:      Right eye: Right conjunctiva is injected. Left eye: Left conjunctiva is injected. Cardiovascular:      Rate and Rhythm: Normal rate and regular rhythm. Heart sounds: Normal heart sounds, S1 normal and S2 normal.   Pulmonary:      Effort: Pulmonary effort is normal.      Breath sounds: Normal breath sounds. Musculoskeletal:      Right lower leg: No edema. Left lower leg: No edema. Skin:     General: Skin is warm and dry. Neurological:      General: No focal deficit present. Mental Status: He is alert and oriented to person, place, and time.       Gait: Gait normal.   Psychiatric:         Attention and Perception: Attention and perception normal.         Mood and Affect: Mood is anxious. Speech: Speech normal.         Behavior: Behavior normal. Behavior is cooperative. Thought Content: Thought content normal.         Cognition and Memory: Cognition and memory normal.         Judgment: Judgment normal.      Comments: Pleasant, good insight. Maintains eye contact, smiling. Repeat BP by me 112/82 - R arm, manual.      ICD-10-CM ICD-9-CM   1. Insomnia, unspecified type  G47.00 780.52   2. Feeling faint  R42 780.4       Orders Placed This Encounter    EKG, 12 LEAD, INITIAL     Order Specific Question:   Reason for Exam:     Answer:   hypertention      Encouraged to try to cut down on smokes and marijuana. Encouraged to continue working on himself, consider employment and counseling. The patient is to follow up with PCP for full CPE. If signs and symptoms become worse the pt is to go to the ER. Codey Morris NP       [facility]    EKG      Date/Time: 3/8/2023 12:03 PM  Performed by: Jayce Price NP  Authorized by:  Jayce Price NP   Previous ECG: no previous ECG available  Rhythm: sinus rhythm  BPM: 84  QRS axis: normal  Clinical impression: non-specific ECG  Comments: NSR  Possible Left atrial enlargement  [place] ECG

## 2023-06-22 ENCOUNTER — OFFICE VISIT (OUTPATIENT)
Age: 38
End: 2023-06-22
Payer: COMMERCIAL

## 2023-06-22 VITALS
BODY MASS INDEX: 25.36 KG/M2 | TEMPERATURE: 98.1 F | OXYGEN SATURATION: 98 % | SYSTOLIC BLOOD PRESSURE: 160 MMHG | RESPIRATION RATE: 14 BRPM | DIASTOLIC BLOOD PRESSURE: 100 MMHG | HEIGHT: 72 IN | HEART RATE: 92 BPM

## 2023-06-22 DIAGNOSIS — Z11.59 ENCOUNTER FOR HEPATITIS C SCREENING TEST FOR LOW RISK PATIENT: ICD-10-CM

## 2023-06-22 DIAGNOSIS — F33.9 RECURRENT MAJOR DEPRESSIVE DISORDER, REMISSION STATUS UNSPECIFIED (HCC): ICD-10-CM

## 2023-06-22 DIAGNOSIS — R03.0 ELEVATED BLOOD-PRESSURE READING, WITHOUT DIAGNOSIS OF HYPERTENSION: Primary | ICD-10-CM

## 2023-06-22 DIAGNOSIS — Z13.220 SCREENING FOR LIPID DISORDERS: ICD-10-CM

## 2023-06-22 DIAGNOSIS — F17.200 SMOKER: ICD-10-CM

## 2023-06-22 DIAGNOSIS — R03.0 ELEVATED BLOOD-PRESSURE READING, WITHOUT DIAGNOSIS OF HYPERTENSION: ICD-10-CM

## 2023-06-22 PROCEDURE — 99214 OFFICE O/P EST MOD 30 MIN: CPT | Performed by: NURSE PRACTITIONER

## 2023-06-22 SDOH — ECONOMIC STABILITY: INCOME INSECURITY: HOW HARD IS IT FOR YOU TO PAY FOR THE VERY BASICS LIKE FOOD, HOUSING, MEDICAL CARE, AND HEATING?: PATIENT DECLINED

## 2023-06-22 SDOH — ECONOMIC STABILITY: FOOD INSECURITY: WITHIN THE PAST 12 MONTHS, YOU WORRIED THAT YOUR FOOD WOULD RUN OUT BEFORE YOU GOT MONEY TO BUY MORE.: PATIENT DECLINED

## 2023-06-22 SDOH — ECONOMIC STABILITY: HOUSING INSECURITY
IN THE LAST 12 MONTHS, WAS THERE A TIME WHEN YOU DID NOT HAVE A STEADY PLACE TO SLEEP OR SLEPT IN A SHELTER (INCLUDING NOW)?: NO

## 2023-06-22 SDOH — ECONOMIC STABILITY: FOOD INSECURITY: WITHIN THE PAST 12 MONTHS, THE FOOD YOU BOUGHT JUST DIDN'T LAST AND YOU DIDN'T HAVE MONEY TO GET MORE.: PATIENT DECLINED

## 2023-06-22 ASSESSMENT — PATIENT HEALTH QUESTIONNAIRE - PHQ9
7. TROUBLE CONCENTRATING ON THINGS, SUCH AS READING THE NEWSPAPER OR WATCHING TELEVISION: 0
3. TROUBLE FALLING OR STAYING ASLEEP: 0
5. POOR APPETITE OR OVEREATING: 0
6. FEELING BAD ABOUT YOURSELF - OR THAT YOU ARE A FAILURE OR HAVE LET YOURSELF OR YOUR FAMILY DOWN: 1
SUM OF ALL RESPONSES TO PHQ QUESTIONS 1-9: 1
SUM OF ALL RESPONSES TO PHQ QUESTIONS 1-9: 1
2. FEELING DOWN, DEPRESSED OR HOPELESS: 0
1. LITTLE INTEREST OR PLEASURE IN DOING THINGS: 0
SUM OF ALL RESPONSES TO PHQ QUESTIONS 1-9: 1
SUM OF ALL RESPONSES TO PHQ QUESTIONS 1-9: 1
9. THOUGHTS THAT YOU WOULD BE BETTER OFF DEAD, OR OF HURTING YOURSELF: 0
SUM OF ALL RESPONSES TO PHQ9 QUESTIONS 1 & 2: 0
4. FEELING TIRED OR HAVING LITTLE ENERGY: 0
8. MOVING OR SPEAKING SO SLOWLY THAT OTHER PEOPLE COULD HAVE NOTICED. OR THE OPPOSITE, BEING SO FIGETY OR RESTLESS THAT YOU HAVE BEEN MOVING AROUND A LOT MORE THAN USUAL: 0

## 2023-06-22 ASSESSMENT — ENCOUNTER SYMPTOMS: SHORTNESS OF BREATH: 0

## 2023-06-22 NOTE — PATIENT INSTRUCTIONS
FOLLOW THESE INSTRUCTIONS AT HOME:    Check your blood pressure as often as recommended by your health care provider. Check your blood pressure at the same time every day. Take your monitor to the next appointment with your health care provider to make sure that:  You are using it correctly. It provides accurate readings. Be sure you understand what your goal blood pressure numbers are. Tell your health care provider if you are having any side effects from blood pressure medicine. Keep all follow-up visits as told by your health care provider. This is important. Patient Counseling  Cardiovascular Health discussed. Current risk factors incljude:  Discussed importance of regular dental and eye visits  Discussed the importance of maintaining a healthy BMI. Discussed healthy food choices. Stressed importance of regular exercise  Discussed moderation of alcohol intake and tobacco cessation or other substances.

## 2023-06-22 NOTE — PROGRESS NOTES
Norma Diaz is a 44 y.o. male  Chief Complaint   Patient presents with    New Patient     No concerns - would like blood work done fasting        Vitals:    06/22/23 1454   BP: (!) 143/92   Pulse: 92   Resp: 14   Temp: 98.1 °F (36.7 °C)   SpO2: 98%      Wt Readings from Last 3 Encounters:   03/08/23 187 lb (84.8 kg)   10/21/21 196 lb (88.9 kg)   10/07/21 186 lb (84.4 kg)     BMI Readings from Last 3 Encounters:   06/22/23 25.36 kg/m²   03/08/23 25.36 kg/m²   10/21/21 26.58 kg/m²     Health Maintenance Due   Topic Date Due    COVID-19 Vaccine (1) Never done    Varicella vaccine (1 of 2 - 2-dose childhood series) Never done    Pneumococcal 0-64 years Vaccine (1 - PCV) Never done    HIV screen  Never done    Hepatitis C screen  Never done    DTaP/Tdap/Td vaccine (1 - Tdap) Never done    A1C test (Diabetic or Prediabetic)  12/05/2020    Depression Monitoring  10/07/2022       HPI  Patient here for follow up  Seen in urgent care several months ago  Was feeling  stressed and has been smoking more marijuana and pipe tobacco cigarettes (Black and Milds) than usual.   Hasn't slept well in \"years\". Currently not working. States he sits in dark all day watching tv and on his phone. Denies feeling down or depressed. Depression/ Insomnia    Elevated blood pressure - poor diet. Smokes cigars. Not currently smoking marijuana. No weight  gain. 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

## 2023-06-23 LAB
ALBUMIN SERPL-MCNC: 4.1 G/DL (ref 3.5–5)
ALBUMIN/GLOB SERPL: 1.1 (ref 1.1–2.2)
ALP SERPL-CCNC: 96 U/L (ref 45–117)
ALT SERPL-CCNC: 80 U/L (ref 12–78)
ANION GAP SERPL CALC-SCNC: 3 MMOL/L (ref 5–15)
AST SERPL-CCNC: 33 U/L (ref 15–37)
BILIRUB SERPL-MCNC: 0.4 MG/DL (ref 0.2–1)
BUN SERPL-MCNC: 10 MG/DL (ref 6–20)
BUN/CREAT SERPL: 9 (ref 12–20)
CALCIUM SERPL-MCNC: 9.5 MG/DL (ref 8.5–10.1)
CHLORIDE SERPL-SCNC: 107 MMOL/L (ref 97–108)
CHOLEST SERPL-MCNC: 235 MG/DL
CO2 SERPL-SCNC: 29 MMOL/L (ref 21–32)
CREAT SERPL-MCNC: 1.06 MG/DL (ref 0.7–1.3)
EST. AVERAGE GLUCOSE BLD GHB EST-MCNC: 105 MG/DL
GLOBULIN SER CALC-MCNC: 3.9 G/DL (ref 2–4)
GLUCOSE SERPL-MCNC: 115 MG/DL (ref 65–100)
HBA1C MFR BLD: 5.3 % (ref 4–5.6)
HCV AB SERPL QL IA: NONREACTIVE
HDLC SERPL-MCNC: 57 MG/DL
HDLC SERPL: 4.1 (ref 0–5)
LDLC SERPL CALC-MCNC: 156.8 MG/DL (ref 0–100)
POTASSIUM SERPL-SCNC: 4.3 MMOL/L (ref 3.5–5.1)
PROT SERPL-MCNC: 8 G/DL (ref 6.4–8.2)
SODIUM SERPL-SCNC: 139 MMOL/L (ref 136–145)
TRIGL SERPL-MCNC: 106 MG/DL
VLDLC SERPL CALC-MCNC: 21.2 MG/DL

## 2023-08-09 NOTE — PROGRESS NOTES
8/9/2023         RE: Mumtaz Montoya  [address]  [facility] [address]        Dear Colleague,    Thank you for referring your patient, Mumtaz Montoya, to the [facility]. Please see a copy of my visit note below.    Diabetes Self-Management Education & Support    Presents for: Individual review    Type of Service: Telephone Visit    Originating Location (Patient Location): Home  Distant Location (Provider Location): Offsite  Mode of Communication:  Telephone    Telephone Visit Start Time:  11:00 AM  Telephone Visit End Time (telephone visit stop time): 11:31 AM    How would patient like to obtain AVS? Decliend    Assessment Type:   REPORTS:          Pt verbalized understanding of concepts discussed and recommendations provided today.       Continue education with the following diabetes management concepts: Monitoring, Taking Medication, Problem Solving, and Reducing Risks    ASSESSMENT:  Spoke with patient with  ID# [id number] and son Julio.     Patient started taking Victoza at 0.6 mg/day and noticed more low BG as evidenced above in the Herson report. When this happened she felt shaky and stopped the blue pen (Victoza). Patient reports feeling better now off Victoza - during this time she was still using Lantus at 30 units.   We discussed restarting Victoza and reducing Lantus to 25 units/day. Patient and son agreeable and demonstrated understanding.  Reviewed importance of getting a Herson back on to wear. Pt states that she checks BG with a meter but unable to report the numbers - just states that it is up and down.   Reviewed hypoglycemia signs and symptoms.  Also reviewed importance of avoiding juice, which patient states she has not been drinking   Glucose Patterns & Trends:  Hypoglycemia, weekend- nocturnal and weekday- nocturnal - these Herson Reports are from 10 days ago - patient has not replaced sensor.     PLAN  Continue with glipizide 5 mg twice daily  Restart Victoza 0.6  1. Have you been to the ER, urgent care clinic since your last visit? Hospitalized since your last visit? no    2. Have you seen or consulted any other health care providers outside of the [address] [place] [address] since your last visit? Include any pap smears or colon screening. No      Health Maintenance Due   Topic Date Due    Hepatitis C Screening  Never done    Pneumococcal 0-64 years (1 of 2 - PPSV23) Never done    COVID-19 Vaccine (1) Never done    DTaP/Tdap/Td series (1 - Tdap) Never done    Flu Vaccine (1) Never done     Patient refuses flu shot. Patient here today to follow up with BP.  Patient cuff 150/80 "mg/day & Decrease Lantus to 25 units/day  Monitor BG daily with glucometer or restart Herson - sent to pharmacy, son aware.   Topics to cover at upcoming visits: Monitoring, Taking Medication, Problem Solving, and Reducing Risks    Follow-up: 4 weeks in person    See Care Plan for co-developed, patient-state behavior change goals.  AVS provided for patient today.    Education Materials Provided:  No new materials provided today      SUBJECTIVE/OBJECTIVE:  Presents for: Individual review  Accompanied by: Self, , Son  Diabetes education in the past 24mo: Yes  Focus of Visit: Patient Unsure  Diabetes type: Type 2  Disease course: Improving  Diabetes management related comments/concerns: low BG/what meds to take  Transportation concerns: Yes  Difficulty affording diabetes medication?: Sometimes  Other concerns:: Language barrier  Cultural Influences/Ethnic Background:  Not  or       Diabetes Symptoms & Complications:  Fatigue: No  Neuropathy: No  Polydipsia: No  Polyphagia: No  Polyuria: No  Visual change: Yes  Symptom course: Improving  Complications assessed today?: No    Patient Problem List and Family Medical History reviewed for relevant medical history, current medical status, and diabetes risk factors.    Vitals:  LMP  (LMP Unknown)   Estimated body mass index is 26.87 kg/m  as calculated from the following:    Height as of 7/17/23: 1.575 m (5' 2\").    Weight as of 7/17/23: 66.6 kg (146 lb 14.4 oz).   Last 3 BP:   BP Readings from Last 3 Encounters:   07/17/23 127/87   05/24/23 118/84   05/16/23 118/80       History   Smoking Status     Never   Smokeless Tobacco     Never       Labs:  Lab Results   Component Value Date    A1C 9.6 07/17/2023     Lab Results   Component Value Date     05/16/2023     Lab Results   Component Value Date     05/16/2023     Direct Measure HDL   Date Value Ref Range Status   05/16/2023 32 (L) >=50 mg/dL Final   ]  GFR Estimate   Date Value Ref Range " Status   05/16/2023 >90 >60 mL/min/1.73m2 Final     Comment:     eGFR calculated using 2021 CKD-EPI equation.     No results found for: GFRESTBLACK  Lab Results   Component Value Date    CR 0.40 05/16/2023     No results found for: MICROALBUMIN    Healthy Eating:  Healthy Eating Assessed Today: Yes  Meal planning/habits: Avoiding sweets  Meals include: Breakfast, Lunch, Dinner  Breakfast: rice and broderick chicken/pork, green vegetables/cucumber  Lunch: rice and broderick chicken/pork, green vegetables/cucumber  Dinner: rice and broderick chicken/pork, green vegetables/cucumber  Snacks: jermaine,  Other: was drinking orange juice - so no longer drinks it  Beverages: Water  Has patient met with a dietitian in the past?: No    Being Active:  Being Active Assessed Today: No    Monitoring:  Monitoring Assessed Today: Yes  Did patient bring glucose meter to appointment? : No  Blood Glucose Meter: CGM (Herson 3 and glucose monitor)  Times checking blood sugar at home (number): 2  Times checking blood sugar at home (per): Day  Blood glucose trend: Decreasing      Taking Medications:  Diabetes Medication(s)       Insulin       insulin glargine (LANTUS SOLOSTAR) 100 UNIT/ML pen    Inject 30 Units Subcutaneous At Bedtime      Sulfonylureas       glipiZIDE (GLUCOTROL) 5 MG tablet    Take 1 tablet (5 mg) by mouth 2 times daily (before meals)      Incretin Mimetic Agents       liraglutide (VICTOZA) 18 MG/3ML solution    Inject 0.6 mg Subcutaneous daily     Patient not taking: Reported on 8/9/2023            Taking Medication Assessed Today: Yes  Current Treatments: Insulin Injections  Dose schedule: At bedtime  Given by: Patient  Injection/Infusion sites: Abdomen  Problems taking diabetes medications regularly?: No  Diabetes medication side effects?: No    Problem Solving:  Problem Solving Assessed Today: Yes  Is the patient at risk for hypoglycemia?: Yes  Hypoglycemia Frequency: Never  Hypoglycemia Treatment: Juice              Reducing  Risks:  Reducing Risks Assessed Today: Yes  Diabetes Risks: Age over 45 years, Sedentary Lifestyle, Ethnicity  Has dilated eye exam at least once a year?: No    Healthy Coping:  Healthy Coping Assessed Today: Yes  Emotional response to diabetes: Ready to learn  Informal Support system:: Children  Stage of change: ACTION (Actively working towards change)  Patient Activation Measure Survey Score:       No data to display                  Care Plan and Education Provided:  Care Plan: Diabetes   Updates made by Astrid Lemus RD since 8/9/2023 12:00 AM        Problem: Diabetes Self-Management Education Needed to Optimize Self-Care Behaviors         Goal: Healthy Eating - follow a healthy eating pattern for diabetes    This Visit's Progress: 100%   Recent Progress: 60%   Note:    Limit to 1 cup rice and no pop       Goal: Monitoring - monitor glucose and ketones as directed    This Visit's Progress: 50%   Recent Progress: 30%   Note:    Check BG 3 times daily       Goal: Problem Solving - know how to prevent and manage short-term diabetes complications         Task: Provide education on low blood glucose - causes, signs/symptoms, prevention, treatment, carrying a carbohydrate source at all times, and medical identification Completed 8/9/2023   Responsible User: Astrid Lemus RD              Time Spent: 30 minutes  Encounter Type: Individual    Any diabetes medication dose changes were made via the CDE Protocol per the patient's referring provider. A copy of this encounter was shared with the provider.

## 2023-08-30 ENCOUNTER — HOSPITAL ENCOUNTER (EMERGENCY)
Facility: HOSPITAL | Age: 38
Discharge: HOME | End: 2023-08-30
Attending: EMERGENCY MEDICINE
Payer: COMMERCIAL

## 2023-08-30 ENCOUNTER — OFFICE VISIT (OUTPATIENT)
Age: 38
End: 2023-08-30

## 2023-08-30 ENCOUNTER — APPOINTMENT (OUTPATIENT)
Facility: HOSPITAL | Age: 38
End: 2023-08-30
Payer: COMMERCIAL

## 2023-08-30 VITALS
TEMPERATURE: 99.1 F | HEART RATE: 108 BPM | BODY MASS INDEX: 24.65 KG/M2 | OXYGEN SATURATION: 99 % | HEIGHT: 72 IN | RESPIRATION RATE: 18 BRPM | WEIGHT: 182 LBS | DIASTOLIC BLOOD PRESSURE: 117 MMHG | SYSTOLIC BLOOD PRESSURE: 172 MMHG

## 2023-08-30 VITALS
BODY MASS INDEX: 24.77 KG/M2 | HEART RATE: 110 BPM | SYSTOLIC BLOOD PRESSURE: 142 MMHG | RESPIRATION RATE: 18 BRPM | DIASTOLIC BLOOD PRESSURE: 87 MMHG | TEMPERATURE: 98 F | WEIGHT: 182.6 LBS | OXYGEN SATURATION: 97 %

## 2023-08-30 DIAGNOSIS — R07.89 ATYPICAL CHEST PAIN: Primary | ICD-10-CM

## 2023-08-30 DIAGNOSIS — R20.2 PARESTHESIA OF LEFT ARM: Primary | ICD-10-CM

## 2023-08-30 DIAGNOSIS — R20.2 PARESTHESIA: ICD-10-CM

## 2023-08-30 DIAGNOSIS — M54.9 UPPER BACK PAIN: ICD-10-CM

## 2023-08-30 DIAGNOSIS — R03.0 ELEVATED BLOOD PRESSURE READING: ICD-10-CM

## 2023-08-30 DIAGNOSIS — R00.0 TACHYCARDIA: ICD-10-CM

## 2023-08-30 LAB
ALBUMIN SERPL-MCNC: 4.4 G/DL (ref 3.5–5)
ALBUMIN/GLOB SERPL: 1 (ref 1.1–2.2)
ALP SERPL-CCNC: 92 U/L (ref 45–117)
ALT SERPL-CCNC: 110 U/L (ref 12–78)
ANION GAP SERPL CALC-SCNC: 6 MMOL/L (ref 5–15)
AST SERPL-CCNC: 32 U/L (ref 15–37)
BASOPHILS # BLD: 0 K/UL (ref 0–0.1)
BASOPHILS NFR BLD: 1 % (ref 0–1)
BILIRUB SERPL-MCNC: 0.8 MG/DL (ref 0.2–1)
BUN SERPL-MCNC: 12 MG/DL (ref 6–20)
BUN/CREAT SERPL: 11 (ref 12–20)
CALCIUM SERPL-MCNC: 9.5 MG/DL (ref 8.5–10.1)
CHLORIDE SERPL-SCNC: 107 MMOL/L (ref 97–108)
CO2 SERPL-SCNC: 25 MMOL/L (ref 21–32)
CREAT SERPL-MCNC: 1.12 MG/DL (ref 0.7–1.3)
D DIMER PPP FEU-MCNC: 0.36 MG/L FEU (ref 0–0.65)
DIFFERENTIAL METHOD BLD: NORMAL
EOSINOPHIL # BLD: 0.1 K/UL (ref 0–0.4)
EOSINOPHIL NFR BLD: 1 % (ref 0–7)
ERYTHROCYTE [DISTWIDTH] IN BLOOD BY AUTOMATED COUNT: 14.2 % (ref 11.5–14.5)
GLOBULIN SER CALC-MCNC: 4.3 G/DL (ref 2–4)
GLUCOSE SERPL-MCNC: 116 MG/DL (ref 65–100)
HCT VFR BLD AUTO: 47.6 % (ref 36.6–50.3)
HGB BLD-MCNC: 15.6 G/DL (ref 12.1–17)
IMM GRANULOCYTES # BLD AUTO: 0 K/UL (ref 0–0.04)
IMM GRANULOCYTES NFR BLD AUTO: 0 % (ref 0–0.5)
LYMPHOCYTES # BLD: 1.9 K/UL (ref 0.8–3.5)
LYMPHOCYTES NFR BLD: 31 % (ref 12–49)
MCH RBC QN AUTO: 27.8 PG (ref 26–34)
MCHC RBC AUTO-ENTMCNC: 32.8 G/DL (ref 30–36.5)
MCV RBC AUTO: 84.8 FL (ref 80–99)
MONOCYTES # BLD: 0.4 K/UL (ref 0–1)
MONOCYTES NFR BLD: 7 % (ref 5–13)
NEUTS SEG # BLD: 3.6 K/UL (ref 1.8–8)
NEUTS SEG NFR BLD: 60 % (ref 32–75)
NRBC # BLD: 0 K/UL (ref 0–0.01)
NRBC BLD-RTO: 0 PER 100 WBC
PLATELET # BLD AUTO: 235 K/UL (ref 150–400)
PMV BLD AUTO: 9.2 FL (ref 8.9–12.9)
POTASSIUM SERPL-SCNC: 3.2 MMOL/L (ref 3.5–5.1)
PROT SERPL-MCNC: 8.7 G/DL (ref 6.4–8.2)
RBC # BLD AUTO: 5.61 M/UL (ref 4.1–5.7)
SODIUM SERPL-SCNC: 138 MMOL/L (ref 136–145)
TROPONIN I SERPL HS-MCNC: 4 NG/L (ref 0–76)
TROPONIN I SERPL HS-MCNC: 5 NG/L (ref 0–76)
WBC # BLD AUTO: 6 K/UL (ref 4.1–11.1)

## 2023-08-30 PROCEDURE — 93005 ELECTROCARDIOGRAM TRACING: CPT

## 2023-08-30 PROCEDURE — 84484 ASSAY OF TROPONIN QUANT: CPT

## 2023-08-30 PROCEDURE — 85379 FIBRIN DEGRADATION QUANT: CPT

## 2023-08-30 PROCEDURE — 71046 X-RAY EXAM CHEST 2 VIEWS: CPT

## 2023-08-30 PROCEDURE — 85025 COMPLETE CBC W/AUTO DIFF WBC: CPT

## 2023-08-30 PROCEDURE — 80053 COMPREHEN METABOLIC PANEL: CPT

## 2023-08-30 PROCEDURE — 99285 EMERGENCY DEPT VISIT HI MDM: CPT

## 2023-08-30 PROCEDURE — 36415 COLL VENOUS BLD VENIPUNCTURE: CPT

## 2023-08-30 ASSESSMENT — LIFESTYLE VARIABLES
HOW MANY STANDARD DRINKS CONTAINING ALCOHOL DO YOU HAVE ON A TYPICAL DAY: 1 OR 2
HOW OFTEN DO YOU HAVE A DRINK CONTAINING ALCOHOL: MONTHLY OR LESS

## 2023-08-30 ASSESSMENT — PAIN - FUNCTIONAL ASSESSMENT: PAIN_FUNCTIONAL_ASSESSMENT: NONE - DENIES PAIN

## 2023-08-30 NOTE — PROGRESS NOTES
Subjective: (As above and below)     The patient/guardian gave verbal consent to treat. Chief Complaint   Patient presents with    Shoulder Pain     Patient presents for left shoulder and arm pain, tingling and numbness, onset yesterday. No injuries or falls per the patient        Ben Chiu is a 44 y.o. male who presents for evaluation of :   Left shoulder pain posterior  Onset last night while laying in bed  Had brief wave of numbness and tingling go town the back of his tricep then resolved. Would like to have EKG today  No PMH of cardiac conditions. Is a smoker. Denies any chest pain, SOB, dizziness, nausea, vomiting, injury or diaphoresis. Symptoms have improved today. Daily smoker. Worried about his heart. Review of Systems    Review of Systems - negative except as listed above    Reviewed PmHx, RxHx, FmHx, SocHx, AllgHx and updated in chart. History reviewed. No pertinent family history.     Past Medical History:   Diagnosis Date    GERD (gastroesophageal reflux disease)       Social History     Socioeconomic History    Marital status: Single     Spouse name: None    Number of children: None    Years of education: None    Highest education level: None   Tobacco Use    Smoking status: Every Day     Types: Pipe     Start date: 1/1/2003     Passive exposure: Never    Smokeless tobacco: Never    Tobacco comments:     Quit smoking: Balck and Milds cigars   Vaping Use    Vaping Use: Never used   Substance and Sexual Activity    Alcohol use: Not Currently    Drug use: Yes     Frequency: 10.0 times per week     Types: Marijuana Viola Krunal)     Social Determinants of Health     Financial Resource Strain: Unknown    Difficulty of Paying Living Expenses: Patient refused   Food Insecurity: Unknown    Worried About Running Out of Food in the Last Year: Patient refused    Ran Out of Food in the Last Year: Patient refused   Transportation Needs: Unknown    Lack of Transportation (Non-Medical): No   Housing

## 2023-08-30 NOTE — PATIENT INSTRUCTIONS
Go to one of the following below immediately without delay:    [facility]   ([address] [place] [address])  [facility]   [phone]   Open 24 hours    [facility]   ([address] [place] [address])  [facility]   [phone] 24 hours    [facility]  ([address] [place] [address])  [facility]   [phone]

## 2023-08-31 LAB
EKG ATRIAL RATE: 97 BPM
EKG DIAGNOSIS: NORMAL
EKG P AXIS: 70 DEGREES
EKG P-R INTERVAL: 182 MS
EKG Q-T INTERVAL: 340 MS
EKG QRS DURATION: 76 MS
EKG QTC CALCULATION (BAZETT): 431 MS
EKG R AXIS: 79 DEGREES
EKG T AXIS: 80 DEGREES
EKG VENTRICULAR RATE: 97 BPM

## 2023-08-31 NOTE — ED PROVIDER NOTES
4\" is still most likely lower than the risk of admission and further testing/imaging. [facility]           CONSULTS:   None    TOBACCO COUNSELING:   Upon evaluation, pt expressed that they are an active tobacco user. For approximately 6 minutes, I counseled pt face-to-face on the dangers of smoking and encouraged quitting as soon as possible in order to decrease further risks to their health. I assessed their readiness to quit smoking and patient states they are actively thinking about it but the habit of smoking as well as euphoric effect of nicotine are barriers. I encouraged them to continue thinking about it and to set a quitting date. I provided specific suggestions on how to quit smoking, including CBT, support groups, acupuncture, medication assistance. Pt has conveyed their understanding of the risks involved should they continue to use tobacco products. Medical Decision Making  Amount and/or Complexity of Data Reviewed  Labs: ordered. Radiology: ordered. ECG/medicine tests: ordered. Disposition Considerations and Planning:  I have discussed with the patient and/or caregiver my initial clinical impression which is based on an evidence-based clinical evaluation of the patient and interpretation of available results. Involved patient and/or caregiver in management, treatment options and final disposition. Patient and/or caregiver verbalizes understanding and agreement. ED Medications Administered  Medications - No data to display    ED Orders Placed:  Orders Placed This Encounter   Procedures    CBC with Auto Differential    Comprehensive Metabolic Panel    Troponin    EKG 12 Lead           FINAL IMPRESSION     1. Atypical chest pain    2. Paresthesia    3. Elevated blood pressure reading          DISPOSITION/PLAN     Progress note:  Patient has been reassessed and reports feeling considerably better, has normal vital signs and feels comfortable going home.  I think this is instructions or to return to the Emergency Department should the patient's condition change prior to their follow-up appointment. The patient and available family and/or caregiver verbally agree with the care plan and all of their questions have been answered. The discharge instructions have also been provided to the them with educational information regarding the patient's diagnosis as well a list of reasons why the patient would want to return to the ER prior to their follow-up appointment should any concerns arise, the patient's condition change or symptoms worsen. Ольга Warren MD, Msc    PLAN:     Medication List      You have not been prescribed any medications. [address], [address] [place] [id number] [address] [place] [address] [address]  [address] [facility]  [phone]    Schedule an appointment as soon as possible for a visit       [facility] EMERGENCY DEPT  [address] [place] [address] [address] [phone]  Go to   As needed, If symptoms worsen    3. Return to ED if worse       I am the Primary Clinician of Record. Azul Etienne MD (electronically signed)    (Please note that parts of this dictation were completed with voice recognition software. Quite often unanticipated grammatical, syntax, homophones, and other interpretive errors are inadvertently transcribed by the computer software. Please disregards these errors.  Please excuse any errors that have escaped final proofreading.)           Azul Etienne MD  09/04/23 9900

## 2023-08-31 NOTE — DISCHARGE INSTRUCTIONS
It was a pleasure taking care of you in our Emergency Department today. We know that when you come to [facility] [facility] [facility] [address], you are entrusting us with your health, comfort, and safety. Our physicians and nurses honor that trust, and truly appreciate the opportunity to care for you and your loved ones. We also value your feedback. If you receive a survey about your Emergency Department experience today, please fill it out. We care about our patients' feedback, and we listen to what you have to say.   Thank you!       --- Dr. Kathy Valle MD

## 2023-09-04 ASSESSMENT — ENCOUNTER SYMPTOMS
SHORTNESS OF BREATH: 0
BACK PAIN: 0
COUGH: 0
COLOR CHANGE: 0
ABDOMINAL PAIN: 0
NAUSEA: 0
VOMITING: 0

## 2023-09-21 ENCOUNTER — HOSPITAL ENCOUNTER (EMERGENCY)
Facility: HOSPITAL | Age: 38
Discharge: HOME | End: 2023-09-21
Payer: COMMERCIAL

## 2023-09-21 ENCOUNTER — APPOINTMENT (OUTPATIENT)
Facility: HOSPITAL | Age: 38
End: 2023-09-21
Payer: COMMERCIAL

## 2023-09-21 VITALS
TEMPERATURE: 98.5 F | SYSTOLIC BLOOD PRESSURE: 137 MMHG | DIASTOLIC BLOOD PRESSURE: 87 MMHG | HEIGHT: 72 IN | WEIGHT: 173.72 LBS | BODY MASS INDEX: 23.53 KG/M2 | OXYGEN SATURATION: 99 % | RESPIRATION RATE: 18 BRPM | HEART RATE: 90 BPM

## 2023-09-21 DIAGNOSIS — S42.402A CLOSED FRACTURE OF LEFT ELBOW, INITIAL ENCOUNTER: Primary | ICD-10-CM

## 2023-09-21 PROCEDURE — 99283 EMERGENCY DEPT VISIT LOW MDM: CPT

## 2023-09-21 PROCEDURE — 29105 APPLICATION LONG ARM SPLINT: CPT

## 2023-09-21 PROCEDURE — 73080 X-RAY EXAM OF ELBOW: CPT

## 2023-09-21 ASSESSMENT — PAIN SCALES - GENERAL: PAINLEVEL_OUTOF10: 3

## 2023-09-21 ASSESSMENT — PAIN - FUNCTIONAL ASSESSMENT: PAIN_FUNCTIONAL_ASSESSMENT: 0-10
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
## 2023-09-22 NOTE — ED PROVIDER NOTES
Splint Application    Date/Time: 9/21/2023 10:29 PM    Performed by: Mylinda Kanner, PA  Authorized by: Mylinda Kanner, PA    Consent:     Consent obtained:  Verbal    Consent given by:  Patient    Risks discussed:  Discoloration, numbness, swelling and pain    Alternatives discussed:  Alternative treatment and referral  Universal protocol:     Procedure explained and questions answered to patient or proxy's satisfaction: yes      Relevant documents present and verified: yes      Test results available: yes      Imaging studies available: yes      Required blood products, implants, devices, and special equipment available: yes      Site/side marked: yes      Immediately prior to procedure a time out was called: yes      Patient identity confirmed:  Verbally with patient  Pre-procedure details:     Distal neurologic exam:  Normal    Distal perfusion: distal pulses strong and brisk capillary refill    Procedure details:     Location:  Elbow    Elbow location:  L elbow    Strapping: yes      Splint type:  Long arm    Supplies:  Elastic bandage, cotton padding and sling (orthoblast)  Post-procedure details:     Distal neurologic exam:  Normal    Distal perfusion: distal pulses strong and brisk capillary refill      Procedure completion:  Tolerated well, no immediate complications    Post-procedure imaging: not applicable            Mylinda Kanner, PA  09/21/23 2238

## 2023-09-23 ENCOUNTER — HOSPITAL ENCOUNTER (EMERGENCY)
Facility: HOSPITAL | Age: 38
Discharge: HOME | End: 2023-09-23
Attending: EMERGENCY MEDICINE
Payer: COMMERCIAL

## 2023-09-23 VITALS
HEART RATE: 88 BPM | OXYGEN SATURATION: 100 % | SYSTOLIC BLOOD PRESSURE: 148 MMHG | RESPIRATION RATE: 14 BRPM | DIASTOLIC BLOOD PRESSURE: 87 MMHG | BODY MASS INDEX: 23.98 KG/M2 | TEMPERATURE: 98.2 F | WEIGHT: 176.81 LBS

## 2023-09-23 DIAGNOSIS — Z47.89 PROBLEM WITH FIBERGLASS CAST: ICD-10-CM

## 2023-09-23 DIAGNOSIS — S52.135D CLOSED NONDISPLACED FRACTURE OF NECK OF LEFT RADIUS WITH ROUTINE HEALING, SUBSEQUENT ENCOUNTER: Primary | ICD-10-CM

## 2023-09-23 PROCEDURE — 4500000002 HC ER NO CHARGE

## 2023-09-23 PROCEDURE — 99282 EMERGENCY DEPT VISIT SF MDM: CPT

## 2023-09-23 ASSESSMENT — PAIN SCALES - GENERAL: PAINLEVEL_OUTOF10: 2
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
## 2023-10-23 ENCOUNTER — OFFICE VISIT (OUTPATIENT)
Age: 38
End: 2023-10-23
Payer: COMMERCIAL

## 2023-10-23 VITALS
SYSTOLIC BLOOD PRESSURE: 153 MMHG | HEIGHT: 72 IN | TEMPERATURE: 98 F | DIASTOLIC BLOOD PRESSURE: 65 MMHG | OXYGEN SATURATION: 100 % | WEIGHT: 170 LBS | RESPIRATION RATE: 20 BRPM | HEART RATE: 74 BPM | BODY MASS INDEX: 23.03 KG/M2

## 2023-10-23 DIAGNOSIS — I10 PRIMARY HYPERTENSION: Primary | ICD-10-CM

## 2023-10-23 DIAGNOSIS — F33.9 RECURRENT MAJOR DEPRESSIVE DISORDER, REMISSION STATUS UNSPECIFIED (HCC): ICD-10-CM

## 2023-10-23 PROCEDURE — 3077F SYST BP >= 140 MM HG: CPT | Performed by: NURSE PRACTITIONER

## 2023-10-23 PROCEDURE — 3078F DIAST BP <80 MM HG: CPT | Performed by: NURSE PRACTITIONER

## 2023-10-23 PROCEDURE — 99213 OFFICE O/P EST LOW 20 MIN: CPT | Performed by: NURSE PRACTITIONER

## 2023-10-23 RX ORDER — LISINOPRIL 10 MG/1
10 TABLET ORAL DAILY
Qty: 30 TABLET | Refills: 5 | Status: SHIPPED | OUTPATIENT
Start: 2023-10-23

## 2023-10-23 ASSESSMENT — ENCOUNTER SYMPTOMS: COUGH: 0

## 2024-08-21 ENCOUNTER — TELEPHONE (OUTPATIENT)
Age: 39
End: 2024-08-21

## (undated) DEVICE — POLYP TRAP: Brand: TRAPEASE®

## (undated) DEVICE — KIT COLON W/ 1.1OZ LUB AND 2 END

## (undated) DEVICE — SNARE ENDOSCP M L240CM W27MM SHTH DIA2.4MM CHN 2.8MM OVL

## (undated) DEVICE — SOLIDIFIER MEDC 1200ML -- CONVERT TO 356117

## (undated) DEVICE — 1200 GUARD II KIT W/5MM TUBE W/O VAC TUBE: Brand: GUARDIAN

## (undated) DEVICE — CONTAINER SPEC 20 ML LID NEUT BUFF FORMALIN 10 % POLYPR STS

## (undated) DEVICE — CANN NASAL O2 CAPNOGRAPHY AD -- FILTERLINE

## (undated) DEVICE — Device

## (undated) DEVICE — SET GRAV CK VLV NEEDLESS ST 3 GANGED 4WAY STPCOCK HI FLO 10

## (undated) DEVICE — SIMPLICITY FLUFF UNDERPAD 23X36, MODERATE: Brand: SIMPLICITY

## (undated) DEVICE — CATH IV AUTOGRD BC PNK 20GA 25 -- INSYTE

## (undated) DEVICE — BAG SPEC BIOHZRD 10 X 10 IN --

## (undated) DEVICE — 3M™ CUROS™ DISINFECTING CAP FOR NEEDLELESS CONNECTORS 270/CARTON 20 CARTONS/CASE CFF1-270: Brand: CUROS™

## (undated) DEVICE — BAG BELONG PT PERS CLEAR HANDL

## (undated) DEVICE — ADULT SPO2 SENSOR: Brand: NELLCOR

## (undated) DEVICE — ELECTRODE,RADIOTRANSLUCENT,FOAM,3PK: Brand: MEDLINE